# Patient Record
Sex: FEMALE | Race: WHITE
[De-identification: names, ages, dates, MRNs, and addresses within clinical notes are randomized per-mention and may not be internally consistent; named-entity substitution may affect disease eponyms.]

---

## 2020-08-13 ENCOUNTER — HOSPITAL ENCOUNTER (OUTPATIENT)
Age: 25
End: 2020-08-13
Payer: OTHER GOVERNMENT

## 2020-08-13 DIAGNOSIS — N93.9: Primary | ICD-10-CM

## 2020-08-13 DIAGNOSIS — R10.2: ICD-10-CM

## 2020-08-13 PROCEDURE — 76830 TRANSVAGINAL US NON-OB: CPT

## 2020-08-13 PROCEDURE — 76856 US EXAM PELVIC COMPLETE: CPT

## 2020-08-13 NOTE — DI.US.S_ITS
PROCEDURE:  US PELVIC COMPLETE  
   
INDICATIONS:  VAG BLEED  
   
TECHNIQUE:    
Real-time scanning was performed of the pelvic organs, with image documentation.    
Additional endovaginal scanning was necessary due to incomplete visualization of the   
adnexal and endometrial structures by transabdominal scanning.    
   
COMPARISON:  None.  
   
FINDINGS:    
Transabdominal scanning:  Limited scanning through the kidneys shows no hydronephrosis.    
No pathologic free abdominal or pelvic fluid.    
   
Endovaginal scanning:    
Uterus:  Uterus is normal in size at 7.2 x 3.2 x 4.1 cm.  The endometrium measures 4.0 mm   
in combined thickness.    
   
Ovaries:  Ovaries normal bilaterally measuring 3.8 x 2.5 x 2.3 cm on the right and 3.4 x   
1.8 x 2.2 cm on the left.  No adnexal masses.  
   
IMPRESSION:  No source for vaginal bleeding identified.  
   
   
Dictated by: Roscoe De La Vega [facility] Interpreted: Donya Rosas MD on 8/13/2020 at 16:21       
Approved by: Donya Rosas MD, PhD on 8/13/2020 at 17:22

## 2021-03-25 ENCOUNTER — HOSPITAL ENCOUNTER (OUTPATIENT)
Age: 26
End: 2021-03-25
Payer: OTHER GOVERNMENT

## 2021-03-25 DIAGNOSIS — R19.5: Primary | ICD-10-CM

## 2021-03-25 LAB
ADD MANUAL DIFF / SLIDE REVIEW: NO
ALBUMIN SERPL-MCNC: 4.5 G/DL (ref 3.5–5)
ALBUMIN/GLOB SERPL: 1.7 {RATIO} (ref 1–2.8)
ALP SERPL-CCNC: 75 U/L (ref 38–126)
ALT SERPL-CCNC: 19 IU/L (ref ?–35)
BUN SERPL-MCNC: 15 MG/DL (ref 7–17)
CALCIUM SERPL-MCNC: 9.6 MG/DL (ref 8.4–10.2)
CHLORIDE SERPL-SCNC: 104 MMOL/L (ref 98–107)
CO2 SERPL-SCNC: 27 MMOL/L (ref 22–32)
ESTIMATED GLOMERULAR FILT RATE: > 60 ML/MIN (ref 60–?)
GLOBULIN SER CALC-MCNC: 2.6 G/DL (ref 1.7–4.1)
GLUCOSE SERPL-MCNC: 90 MG/DL (ref 70–100)
HEMATOCRIT: 40.3 % (ref 36–46)
HEMOGLOBIN: 13.8 G/DL (ref 12–16)
HEMOLYSIS: < 15 (ref 0–50)
LYMPHOCYTES # SPEC AUTO: 2000 /UL (ref 1100–4500)
MCV RBC: 88.6 FL (ref 80–100)
MEAN CORPUSCULAR HEMOGLOBIN: 30.5 PG (ref 26–34)
MEAN CORPUSCULAR HGB CONC: 34.4 % (ref 30–36)
PLATELET COUNT: 255 X10^3/UL (ref 150–400)
POTASSIUM SERPL-SCNC: 4 MMOL/L (ref 3.4–5.1)
PROT SERPL-MCNC: 7.1 G/DL (ref 6.3–8.2)
SODIUM SERPL-SCNC: 140 MMOL/L (ref 137–145)

## 2021-03-25 PROCEDURE — 80053 COMPREHEN METABOLIC PANEL: CPT

## 2021-03-25 PROCEDURE — 85025 COMPLETE CBC W/AUTO DIFF WBC: CPT

## 2021-03-25 PROCEDURE — 36415 COLL VENOUS BLD VENIPUNCTURE: CPT

## 2021-03-30 ENCOUNTER — HOSPITAL ENCOUNTER (OUTPATIENT)
Age: 26
End: 2021-03-30
Payer: OTHER GOVERNMENT

## 2021-03-30 DIAGNOSIS — R19.5: Primary | ICD-10-CM

## 2021-03-30 PROCEDURE — 83013 H PYLORI (C-13) BREATH: CPT

## 2021-04-21 ENCOUNTER — HOSPITAL ENCOUNTER (OUTPATIENT)
Age: 26
End: 2021-04-21
Payer: OTHER GOVERNMENT

## 2021-04-21 DIAGNOSIS — R19.5: Primary | ICD-10-CM

## 2021-04-21 PROCEDURE — 82270 OCCULT BLOOD FECES: CPT

## 2021-04-21 PROCEDURE — 87045 FECES CULTURE AEROBIC BACT: CPT

## 2021-04-21 PROCEDURE — 87899 AGENT NOS ASSAY W/OPTIC: CPT

## 2021-04-29 ENCOUNTER — HOSPITAL ENCOUNTER (OUTPATIENT)
Age: 26
End: 2021-04-29
Payer: OTHER GOVERNMENT

## 2021-04-29 DIAGNOSIS — L65.9: Primary | ICD-10-CM

## 2021-04-29 DIAGNOSIS — R53.83: ICD-10-CM

## 2021-04-29 LAB
25(OH)D3+25(OH)D2 SERPL-MCNC: 48.5 NG/ML (ref 30–100)
FERRITIN SERPL-MCNC: 33 NG/ML (ref 6–137)
HEMOLYSIS: < 15 (ref 0–50)
IRON SERPL-MCNC: 52 UG/DL (ref 37–170)
PERCENT IRON SATURATION: 15 % (ref 15–50)
TIBC SERPL-MCNC: 342 UG/DL (ref 265–497)
TRANSFERRIN SERPL-MCNC: 272 MG/DL (ref 206–381)
TSH W/ REFLEX TO FT4: 1.06 UIU/ML (ref 0.47–4.68)

## 2021-04-29 PROCEDURE — 36415 COLL VENOUS BLD VENIPUNCTURE: CPT

## 2021-04-29 PROCEDURE — 86038 ANTINUCLEAR ANTIBODIES: CPT

## 2021-04-29 PROCEDURE — 82728 ASSAY OF FERRITIN: CPT

## 2021-04-29 PROCEDURE — 86140 C-REACTIVE PROTEIN: CPT

## 2021-04-29 PROCEDURE — 82306 VITAMIN D 25 HYDROXY: CPT

## 2021-04-29 PROCEDURE — 85651 RBC SED RATE NONAUTOMATED: CPT

## 2021-04-29 PROCEDURE — 83540 ASSAY OF IRON: CPT

## 2021-04-29 PROCEDURE — 83550 IRON BINDING TEST: CPT

## 2021-04-29 PROCEDURE — 84443 ASSAY THYROID STIM HORMONE: CPT

## 2022-02-10 ENCOUNTER — HOSPITAL ENCOUNTER (OUTPATIENT)
Age: 27
End: 2022-02-10
Payer: OTHER GOVERNMENT

## 2022-02-10 ENCOUNTER — HOSPITAL ENCOUNTER
Age: 27
End: 2022-02-10
Payer: OTHER GOVERNMENT

## 2022-02-10 DIAGNOSIS — L50.9: Primary | ICD-10-CM

## 2022-02-10 DIAGNOSIS — R21: Primary | ICD-10-CM

## 2022-02-10 DIAGNOSIS — R21: ICD-10-CM

## 2022-02-10 PROCEDURE — 36415 COLL VENOUS BLD VENIPUNCTURE: CPT

## 2022-02-10 PROCEDURE — 87169 MACROSCOPIC EXAM PARASITE: CPT

## 2022-02-10 PROCEDURE — 82785 ASSAY OF IGE: CPT

## 2022-02-10 PROCEDURE — 86003 ALLG SPEC IGE CRUDE XTRC EA: CPT

## 2022-02-13 LAB
A ALTERNATA IGE QN: <0.1 KU/L
BOXELDER IGE QN: <0.1 KU/L
CMN PIGWEED IGE QN: <0.1 KU/L
CODFISH ALLERGY IGE: < 0.1 KU/L
D FARINAE IGE QN: <0.1 KU/L
D PTERONYSS IGE QN: <0.1 KU/L
GREY ALDER IGE QN: <0.1 KU/L
HAZELNUT IGE: <0.1 KU/L
MOUSE URINE PROT IGE QN: <0.1 KU/L
RAGWEED, SHORT: <0.1 KU/L
SALMON ALLERGY IGE: < 0.1 KU/L
SCALLOP ALLERGY IGE: 0.21 KU/L
SESAME SEED ALLERGY IGE: < 0.1 KU/L
TUNA ALLERGY IGE: < 0.1 KU/L
WALNUT IGE QN: < 0.1 KU/L
WHEAT ALLERGY IGE: < 0.1 KU/L

## 2022-09-24 ENCOUNTER — HOSPITAL ENCOUNTER (OUTPATIENT)
Dept: HOSPITAL 76 - LAB.N | Age: 27
Discharge: HOME | End: 2022-09-24
Attending: ADVANCED PRACTICE MIDWIFE
Payer: COMMERCIAL

## 2022-09-24 DIAGNOSIS — O20.0: Primary | ICD-10-CM

## 2022-09-24 PROCEDURE — 36415 COLL VENOUS BLD VENIPUNCTURE: CPT

## 2022-09-24 PROCEDURE — 84702 CHORIONIC GONADOTROPIN TEST: CPT

## 2022-09-26 ENCOUNTER — HOSPITAL ENCOUNTER (OUTPATIENT)
Dept: HOSPITAL 76 - LAB.N | Age: 27
Discharge: HOME | End: 2022-09-26
Attending: ADVANCED PRACTICE MIDWIFE
Payer: COMMERCIAL

## 2022-09-26 DIAGNOSIS — O20.0: Primary | ICD-10-CM

## 2022-09-26 PROCEDURE — 36415 COLL VENOUS BLD VENIPUNCTURE: CPT

## 2022-09-26 PROCEDURE — 84702 CHORIONIC GONADOTROPIN TEST: CPT

## 2022-10-12 ENCOUNTER — HOSPITAL ENCOUNTER (OUTPATIENT)
Age: 27
End: 2022-10-12
Payer: OTHER GOVERNMENT

## 2022-10-12 DIAGNOSIS — R53.83: Primary | ICD-10-CM

## 2022-10-12 DIAGNOSIS — R63.5: ICD-10-CM

## 2022-10-12 LAB
25(OH)D3+25(OH)D2 SERPL-MCNC: 46.8 NG/ML (ref 30–100)
ALBUMIN SERPL-MCNC: 4.5 G/DL (ref 3.5–5)
ALBUMIN/GLOB SERPL: 1.5 {RATIO} (ref 1–2.8)
ALP SERPL-CCNC: 61 U/L (ref 38–126)
ALT SERPL-CCNC: 25 IU/L (ref ?–35)
BUN SERPL-MCNC: 15 MG/DL (ref 7–17)
CALCIUM SERPL-MCNC: 9 MG/DL (ref 8.4–10.2)
CHLORIDE SERPL-SCNC: 103 MMOL/L (ref 98–107)
CO2 SERPL-SCNC: 26 MMOL/L (ref 22–32)
ESTIMATED GLOMERULAR FILT RATE: > 60 ML/MIN (ref 60–?)
GLOBULIN SER CALC-MCNC: 3 G/DL (ref 1.7–4.1)
GLUCOSE SERPL-MCNC: 101 MG/DL (ref 70–100)
HEMATOCRIT: 40.1 % (ref 36–46)
HEMOGLOBIN: 14 G/DL (ref 12–16)
HEMOLYSIS: < 15 (ref 0–50)
MAGNESIUM SERPL-MCNC: 2.1 MG/DL (ref 1.6–2.3)
MCV RBC: 87 FL (ref 80–100)
MEAN CORPUSCULAR HEMOGLOBIN: 30.3 PG (ref 26–34)
MEAN CORPUSCULAR HGB CONC: 34.8 % (ref 30–36)
PLATELET COUNT: 257 X10^3/UL (ref 150–400)
POTASSIUM SERPL-SCNC: 4 MMOL/L (ref 3.4–5.1)
PROT SERPL-MCNC: 7.5 G/DL (ref 6.3–8.2)
SODIUM SERPL-SCNC: 141 MMOL/L (ref 137–145)
TSH W/ REFLEX TO FT4: 1.89 UIU/ML (ref 0.47–4.68)

## 2022-10-12 PROCEDURE — 83735 ASSAY OF MAGNESIUM: CPT

## 2022-10-12 PROCEDURE — 85027 COMPLETE CBC AUTOMATED: CPT

## 2022-10-12 PROCEDURE — 36415 COLL VENOUS BLD VENIPUNCTURE: CPT

## 2022-10-12 PROCEDURE — 80053 COMPREHEN METABOLIC PANEL: CPT

## 2022-10-12 PROCEDURE — 84443 ASSAY THYROID STIM HORMONE: CPT

## 2022-10-12 PROCEDURE — 82306 VITAMIN D 25 HYDROXY: CPT

## 2022-12-16 ENCOUNTER — HOSPITAL ENCOUNTER (OUTPATIENT)
Age: 27
End: 2022-12-16
Payer: OTHER GOVERNMENT

## 2022-12-16 DIAGNOSIS — L65.9: Primary | ICD-10-CM

## 2022-12-16 DIAGNOSIS — R53.83: ICD-10-CM

## 2022-12-16 LAB — T4 FREE SERPL-MCNC: 1.17 NG/DL (ref 0.78–2.19)

## 2022-12-16 PROCEDURE — 36415 COLL VENOUS BLD VENIPUNCTURE: CPT

## 2022-12-16 PROCEDURE — 84439 ASSAY OF FREE THYROXINE: CPT

## 2023-01-16 ENCOUNTER — HOSPITAL ENCOUNTER (OUTPATIENT)
Dept: HOSPITAL 76 - LAB | Age: 28
Discharge: HOME | End: 2023-01-16
Attending: ADVANCED PRACTICE MIDWIFE
Payer: COMMERCIAL

## 2023-01-16 DIAGNOSIS — Z32.01: Primary | ICD-10-CM

## 2023-01-16 PROCEDURE — 84702 CHORIONIC GONADOTROPIN TEST: CPT

## 2023-01-16 PROCEDURE — 36415 COLL VENOUS BLD VENIPUNCTURE: CPT

## 2023-01-18 ENCOUNTER — HOSPITAL ENCOUNTER (OUTPATIENT)
Dept: HOSPITAL 76 - LAB | Age: 28
Discharge: HOME | End: 2023-01-18
Attending: ADVANCED PRACTICE MIDWIFE
Payer: COMMERCIAL

## 2023-01-18 DIAGNOSIS — Z32.01: Primary | ICD-10-CM

## 2023-01-18 PROCEDURE — 36415 COLL VENOUS BLD VENIPUNCTURE: CPT

## 2023-01-18 PROCEDURE — 84702 CHORIONIC GONADOTROPIN TEST: CPT

## 2023-02-23 ENCOUNTER — HOSPITAL ENCOUNTER (OUTPATIENT)
Dept: HOSPITAL 76 - LAB | Age: 28
Discharge: HOME | End: 2023-02-23
Attending: ADVANCED PRACTICE MIDWIFE
Payer: COMMERCIAL

## 2023-02-23 DIAGNOSIS — Z36.89: Primary | ICD-10-CM

## 2023-02-23 LAB
BASOPHILS NFR BLD AUTO: 0 10^3/UL (ref 0–0.1)
BASOPHILS NFR BLD AUTO: 0.6 %
EOSINOPHIL # BLD AUTO: 0 10^3/UL (ref 0–0.7)
EOSINOPHIL NFR BLD AUTO: 0.8 %
ERYTHROCYTE [DISTWIDTH] IN BLOOD BY AUTOMATED COUNT: 13.2 % (ref 12–15)
HCT VFR BLD AUTO: 40.6 % (ref 37–47)
HGB UR QL STRIP: 13.8 G/DL (ref 12–16)
LYMPHOCYTES # SPEC AUTO: 1.3 10^3/UL (ref 1.5–3.5)
LYMPHOCYTES NFR BLD AUTO: 24.9 %
MCH RBC QN AUTO: 29.3 PG (ref 27–31)
MCHC RBC AUTO-ENTMCNC: 34 G/DL (ref 32–36)
MCV RBC AUTO: 86.2 FL (ref 81–99)
MONOCYTES # BLD AUTO: 0.6 10^3/UL (ref 0–1)
MONOCYTES NFR BLD AUTO: 11.2 %
NEUTROPHILS # BLD AUTO: 3.2 10^3/UL (ref 1.5–6.6)
NEUTROPHILS # SNV AUTO: 5.1 X10^3/UL (ref 4.8–10.8)
NEUTROPHILS NFR BLD AUTO: 62.3 %
NRBC # BLD AUTO: 0 /100WBC
NRBC # BLD AUTO: 0 X10^3/UL
PDW BLD AUTO: 9.4 FL (ref 7.9–10.8)
PLATELET # BLD: 252 10^3/UL (ref 130–450)
RBC MAR: 4.71 10^6/UL (ref 4.2–5.4)

## 2023-02-23 PROCEDURE — 86762 RUBELLA ANTIBODY: CPT

## 2023-02-23 PROCEDURE — 86803 HEPATITIS C AB TEST: CPT

## 2023-02-23 PROCEDURE — 86787 VARICELLA-ZOSTER ANTIBODY: CPT

## 2023-02-23 PROCEDURE — 86901 BLOOD TYPING SEROLOGIC RH(D): CPT

## 2023-02-23 PROCEDURE — 87340 HEPATITIS B SURFACE AG IA: CPT

## 2023-02-23 PROCEDURE — 86850 RBC ANTIBODY SCREEN: CPT

## 2023-02-23 PROCEDURE — 86900 BLOOD TYPING SEROLOGIC ABO: CPT

## 2023-02-23 PROCEDURE — 85025 COMPLETE CBC W/AUTO DIFF WBC: CPT

## 2023-02-23 PROCEDURE — 36415 COLL VENOUS BLD VENIPUNCTURE: CPT

## 2023-02-23 PROCEDURE — 86592 SYPHILIS TEST NON-TREP QUAL: CPT

## 2023-02-23 PROCEDURE — 87389 HIV-1 AG W/HIV-1&-2 AB AG IA: CPT

## 2023-02-24 LAB — VZV IGG SER IA-ACNC: 2561 INDEX

## 2025-07-15 ENCOUNTER — HOSPITAL ENCOUNTER (OUTPATIENT)
Age: 30
End: 2025-07-15
Payer: OTHER GOVERNMENT

## 2025-07-15 DIAGNOSIS — R53.83: ICD-10-CM

## 2025-07-15 DIAGNOSIS — N91.5: ICD-10-CM

## 2025-07-15 DIAGNOSIS — L68.0: ICD-10-CM

## 2025-07-15 DIAGNOSIS — Z00.00: Primary | ICD-10-CM

## 2025-07-15 LAB
25(OH)D3+25(OH)D2 SERPL-MCNC: 58.3 NG/ML (ref 30–100)
ALBUMIN SERPL-MCNC: 4.7 G/DL (ref 3.5–5)
ALBUMIN/GLOB SERPL: 1.9 {RATIO} (ref 1–2.8)
ALP SERPL-CCNC: 51 U/L (ref 38–126)
ALT SERPL-CCNC: 31 IU/L (ref ?–35)
AST SERPL-CCNC: 26 IU/L (ref 14–36)
BILIRUB SERPL-MCNC: 0.6 MG/DL (ref 0.2–1.3)
BUN SERPL-MCNC: 19 MG/DL (ref 7–17)
BUN/CREAT SERPL: 26.8 (ref 6–22)
CALCIUM SERPL-MCNC: 9.3 MG/DL (ref 8.4–10.2)
CHLORIDE SERPL-SCNC: 105 MMOL/L (ref 98–107)
CHOLEST SERPL-MCNC: 167 MG/DL (ref 140–199)
CO2 SERPL-SCNC: 24 MMOL/L (ref 22–32)
CREAT SERPL-MCNC: 0.71 MG/DL (ref 0.52–1.04)
ESTIMATED GLOMERULAR FILT RATE: > 60 ML/MIN (ref 60–?)
ESTRADIOL SERPL-MCNC: 38.9 PG/ML
FERRITIN SERPL-MCNC: 30 NG/ML (ref 6–137)
FSH SERPL-ACNC: 5.39 MIU/ML
GLOBULIN SER CALC-MCNC: 2.5 G/DL (ref 1.7–4.1)
GLUCOSE SERPL-MCNC: 93 MG/DL (ref 70–99)
HDLC SERPL-MCNC: 49 MG/DL (ref 40–60)
HEMATOCRIT: 43.5 % (ref 36–46)
HEMOGLOBIN: 14.7 G/DL (ref 12–16)
HEMOLYSIS: < 15 (ref 0–50)
HEMOLYSIS: < 15 (ref 0–50)
IRON SERPL-MCNC: 141 UG/DL (ref 37–170)
LDLC SERPL CALC-MCNC: 107 MG/DL (ref ?–100)
LH SERPL-ACNC: 4.26 MIU/ML
MCH RBC QN AUTO: 29.8 PG (ref 26–34)
MCV RBC: 88 FL (ref 80–100)
MEAN CORPUSCULAR HGB CONC: 33.9 % (ref 30–36)
PERCENT IRON SATURATION: 43 % (ref 15–50)
PLATELET COUNT: 241 X10^3/UL (ref 150–400)
POTASSIUM SERPL-SCNC: 4.5 MMOL/L (ref 3.4–5.1)
PROLACTIN SERPL-MCNC: 7.4 NG/ML (ref 3–18.6)
PROT SERPL-MCNC: 7.2 G/DL (ref 6.3–8.2)
RED BLOOD CELL COUNT: 4.95 X10^6/UL (ref 4–5.2)
RED CELL DISTRIBUTION WIDTH: 13 % (ref 11.6–14.8)
SODIUM SERPL-SCNC: 138 MMOL/L (ref 137–145)
T4 FREE SERPL-MCNC: 0.98 NG/DL (ref 0.78–2.19)
TESTOST SERPL-MCNC: 32.5 NG/DL (ref 5.71–77)
TIBC SERPL-MCNC: 326 UG/DL (ref 265–497)
TRANSFERRIN SERPL-MCNC: 255 MG/DL (ref 206–381)
TRIGL SERPL-MCNC: 54 MG/DL (ref 35–150)
TSH SERPL DL<=0.05 MIU/L-ACNC: 0.82 UIU/ML (ref 0.47–4.68)
WHITE BLOOD CELL COUNT: 3.6 X10^3/UL (ref 4.5–11)

## 2025-07-15 PROCEDURE — 83001 ASSAY OF GONADOTROPIN (FSH): CPT

## 2025-07-15 PROCEDURE — 84443 ASSAY THYROID STIM HORMONE: CPT

## 2025-07-15 PROCEDURE — 83540 ASSAY OF IRON: CPT

## 2025-07-15 PROCEDURE — 82306 VITAMIN D 25 HYDROXY: CPT

## 2025-07-15 PROCEDURE — 83498 ASY HYDROXYPROGESTERONE 17-D: CPT

## 2025-07-15 PROCEDURE — 84403 ASSAY OF TOTAL TESTOSTERONE: CPT

## 2025-07-15 PROCEDURE — 80061 LIPID PANEL: CPT

## 2025-07-15 PROCEDURE — 84439 ASSAY OF FREE THYROXINE: CPT

## 2025-07-15 PROCEDURE — 84146 ASSAY OF PROLACTIN: CPT

## 2025-07-15 PROCEDURE — 82728 ASSAY OF FERRITIN: CPT

## 2025-07-15 PROCEDURE — 36415 COLL VENOUS BLD VENIPUNCTURE: CPT

## 2025-07-15 PROCEDURE — 83550 IRON BINDING TEST: CPT

## 2025-07-15 PROCEDURE — 85027 COMPLETE CBC AUTOMATED: CPT

## 2025-07-15 PROCEDURE — 82670 ASSAY OF TOTAL ESTRADIOL: CPT

## 2025-07-15 PROCEDURE — 80053 COMPREHEN METABOLIC PANEL: CPT

## 2025-07-15 PROCEDURE — 82627 DEHYDROEPIANDROSTERONE: CPT

## 2025-07-15 PROCEDURE — 83002 ASSAY OF GONADOTROPIN (LH): CPT
